# Patient Record
Sex: MALE | Race: WHITE | NOT HISPANIC OR LATINO | URBAN - METROPOLITAN AREA
[De-identification: names, ages, dates, MRNs, and addresses within clinical notes are randomized per-mention and may not be internally consistent; named-entity substitution may affect disease eponyms.]

---

## 2023-05-31 RX ORDER — LEVOTHYROXINE SODIUM 125 MCG
1 TABLET ORAL
Refills: 0 | DISCHARGE

## 2023-05-31 RX ORDER — ALLOPURINOL 300 MG
0 TABLET ORAL
Refills: 0 | DISCHARGE

## 2023-05-31 NOTE — ASU PATIENT PROFILE, ADULT - FALL HARM RISK - UNIVERSAL INTERVENTIONS
Bed in lowest position, wheels locked, appropriate side rails in place/Call bell, personal items and telephone in reach/Instruct patient to call for assistance before getting out of bed or chair/Non-slip footwear when patient is out of bed/[place] to call system/Physically safe environment - no spills, clutter or unnecessary equipment/Purposeful Proactive Rounding/Room/bathroom lighting operational, light cord in reach

## 2023-05-31 NOTE — ASU PATIENT PROFILE, ADULT - NSICDXPASTSURGICALHX_GEN_ALL_CORE_FT
PAST SURGICAL HISTORY:  History of kidney donation     History of surgery on left wrist     Left ACL tear Meniscus tear

## 2023-05-31 NOTE — ASU PATIENT PROFILE, ADULT - REASON FOR ADMISSION, PROFILE
-Patient noted to have large amount of ascites on imaging  -Will go for paracentesis today  -Will test to rule out SBP   Septoplasty

## 2023-06-01 ENCOUNTER — OUTPATIENT (OUTPATIENT)
Dept: OUTPATIENT SERVICES | Facility: HOSPITAL | Age: 43
LOS: 1 days | Discharge: HOME | End: 2023-06-01

## 2023-06-01 VITALS
OXYGEN SATURATION: 100 % | SYSTOLIC BLOOD PRESSURE: 135 MMHG | TEMPERATURE: 98 F | DIASTOLIC BLOOD PRESSURE: 66 MMHG | RESPIRATION RATE: 16 BRPM | HEART RATE: 92 BPM

## 2023-06-01 VITALS
HEIGHT: 73 IN | TEMPERATURE: 98 F | HEART RATE: 55 BPM | SYSTOLIC BLOOD PRESSURE: 107 MMHG | DIASTOLIC BLOOD PRESSURE: 71 MMHG | RESPIRATION RATE: 14 BRPM | WEIGHT: 238.1 LBS | OXYGEN SATURATION: 98 %

## 2023-06-01 DIAGNOSIS — Z90.5 ACQUIRED ABSENCE OF KIDNEY: Chronic | ICD-10-CM

## 2023-06-01 DIAGNOSIS — Z98.890 OTHER SPECIFIED POSTPROCEDURAL STATES: Chronic | ICD-10-CM

## 2023-06-01 DIAGNOSIS — S83.512A SPRAIN OF ANTERIOR CRUCIATE LIGAMENT OF LEFT KNEE, INITIAL ENCOUNTER: Chronic | ICD-10-CM

## 2023-06-01 RX ORDER — SODIUM CHLORIDE 9 MG/ML
500 INJECTION, SOLUTION INTRAVENOUS
Refills: 0 | Status: DISCONTINUED | OUTPATIENT
Start: 2023-06-01 | End: 2023-06-01

## 2023-06-01 RX ORDER — HYDROMORPHONE HYDROCHLORIDE 2 MG/ML
0.5 INJECTION INTRAMUSCULAR; INTRAVENOUS; SUBCUTANEOUS
Refills: 0 | Status: DISCONTINUED | OUTPATIENT
Start: 2023-06-01 | End: 2023-06-01

## 2023-06-01 RX ORDER — APREPITANT 80 MG/1
40 CAPSULE ORAL ONCE
Refills: 0 | Status: COMPLETED | OUTPATIENT
Start: 2023-06-01 | End: 2023-06-01

## 2023-06-01 RX ORDER — ONDANSETRON 8 MG/1
4 TABLET, FILM COATED ORAL ONCE
Refills: 0 | Status: COMPLETED | OUTPATIENT
Start: 2023-06-01 | End: 2023-06-01

## 2023-06-01 RX ORDER — OXYCODONE HYDROCHLORIDE 5 MG/1
5 TABLET ORAL ONCE
Refills: 0 | Status: DISCONTINUED | OUTPATIENT
Start: 2023-06-01 | End: 2023-06-01

## 2023-06-01 RX ORDER — ACETAMINOPHEN 500 MG
1000 TABLET ORAL ONCE
Refills: 0 | Status: COMPLETED | OUTPATIENT
Start: 2023-06-01 | End: 2023-06-01

## 2023-06-01 RX ORDER — FENTANYL CITRATE 50 UG/ML
50 INJECTION INTRAVENOUS
Refills: 0 | Status: DISCONTINUED | OUTPATIENT
Start: 2023-06-01 | End: 2023-06-01

## 2023-06-01 RX ORDER — SODIUM CHLORIDE 9 MG/ML
1000 INJECTION, SOLUTION INTRAVENOUS ONCE
Refills: 0 | Status: COMPLETED | OUTPATIENT
Start: 2023-06-01 | End: 2023-06-01

## 2023-06-01 RX ADMIN — HYDROMORPHONE HYDROCHLORIDE 0.5 MILLIGRAM(S): 2 INJECTION INTRAMUSCULAR; INTRAVENOUS; SUBCUTANEOUS at 20:25

## 2023-06-01 RX ADMIN — Medication 1000 MILLIGRAM(S): at 13:45

## 2023-06-01 RX ADMIN — SODIUM CHLORIDE 1000 MILLILITER(S): 9 INJECTION, SOLUTION INTRAVENOUS at 20:26

## 2023-06-01 RX ADMIN — HYDROMORPHONE HYDROCHLORIDE 0.5 MILLIGRAM(S): 2 INJECTION INTRAMUSCULAR; INTRAVENOUS; SUBCUTANEOUS at 20:40

## 2023-06-01 RX ADMIN — OXYCODONE HYDROCHLORIDE 5 MILLIGRAM(S): 5 TABLET ORAL at 21:09

## 2023-06-01 RX ADMIN — ONDANSETRON 4 MILLIGRAM(S): 8 TABLET, FILM COATED ORAL at 20:40

## 2023-06-01 RX ADMIN — OXYCODONE HYDROCHLORIDE 5 MILLIGRAM(S): 5 TABLET ORAL at 21:30

## 2023-06-01 RX ADMIN — APREPITANT 40 MILLIGRAM(S): 80 CAPSULE ORAL at 13:44

## 2023-06-01 NOTE — PRE-ANESTHESIA EVALUATION ADULT - NSANTHADDINFOFT_GEN_ALL_CORE
Pt accepts all anesthesia risks IBNLT: Dental, Pharyngeal, Laryngeal, Tracheal Trauma, Sore Throat, Hoarseness, Recurrent Laryngeal Nerve Dysfunction, Upper and Lower Extremity Paresthesias, Nausea and Vomiting, Potential exacerbation of asthma and JAMARCUS.

## 2023-06-01 NOTE — PRE-ANESTHESIA EVALUATION ADULT - NSPREOPDXFT_GEN_ALL_CORE
OFFICE VISIT    Patient: Robbie Durbin   : 1981 MRN: [id number]    SUBJECTIVE:  Chief Complaint   Patient presents with   • Annual Exam       Patient has given consent to record this visit for documentation in their clinical record.    A 41 year old male presents for annual physical examination.     HISTORY OF PRESENT ILLNESS:  Historian: Self.    Preventative health care:  Diet and exercise: Doing running daily but not always eating healthy food.  Immunization: Inquires about COVID-19 vaccine.  Screening labs:Due for routine labs.     Need for influenza vaccination: Requests the Influenza vaccine.    Chest wall discomfort: Has pain in the chest while taking deep breath from one week. States physical activity causes chest discomfort. Working in the [employer] where he worked a task after a gap of two years of doing this task.  Involved lowering essentially a lever against his right chest repeatedly with force.    Additional comments:  Nasal discharge: Reports nasal discharge due to the weather change. Wearing his mask today.    Elevated LDL level: States the LDL levels has been elevating for about two years. Reports family history of high cholesterol in his father.     PAST MEDICAL HISTORY:  Past Medical History:   Diagnosis Date   • Kidney stone    • Prostatitis      MEDICATIONS:  Current Outpatient Medications   Medication Sig Dispense Refill   • fluticasone (FLONASE) 50 MCG/ACT nasal spray Spray 2 sprays in each nostril daily. 48 g 3     No current facility-administered medications for this visit.     ALLERGIES:  Allergies as of 2022   • (No Known Allergies)     FAMILY HISTORY:  Family History   Problem Relation Age of Onset   • Cataracts Mother    • Hypertension Mother    • Diabetes Mother    • Hyperlipidemia Father    • Sjogren's Syndrome Sister    • Cancer Sister         eye   • Patient is unaware of any medical problems Sister    • Patient is unaware of any medical problems Brother    • Cataracts  Maternal Grandmother    • Cataracts Maternal Grandfather    • Macular degeneration Maternal Grandfather    • Patient is unaware of any medical problems Paternal Grandmother    • Alcohol Abuse Paternal Grandfather      SOCIAL HISTORY:  Social History     Tobacco Use   • Smoking status: Never Smoker   • Smokeless tobacco: Never Used   Vaping Use   • Vaping Use: never used   Substance Use Topics   • Alcohol use: Yes     Comment: occ   • Drug use: Never     Past Surgical HISTORY  Past Surgical History:   Procedure Laterality Date   • Appendectomy     • Lasik surgery Bilateral    • [place] tooth extraction         REVIEW OF SYSTEMS:  Musculoskeletal: Per HPI.    OBJECTIVE:  Visit Vitals  /70   Pulse (!) 46   Temp 96 °F (35.6 °C)   Ht 6' 2\" (1.88 m)   Wt 77.6 kg (171 lb)   BMI 21.96 kg/m²       PHYSICAL EXAM:  Constitutional: alert, in no acute distress and current vital signs reviewed.   Head and Face: atraumatic, no deformities, normocephalic, normal facies.  Eyes: no discharge, normal conjunctiva, no eyelid swelling, no ptosis and the sclerae were normal. Pupils equal, round and reactive to light, conjugate gaze and extraocular movements were intact.   ENT: normal appearing outer ear, normal appearing nose. Examination of the tympanic membrane showed normal landmarks, normal appearing external canal. Nasal mucosa moist and pink, no nasal discharge. Normal lips. Oral mucosa pink and moist, no oral lesions.   Neck: normal appearing neck, supple neck and no mass was seen. Thyroid not enlarged and no thyroid nodules.   Lymphatic: no lymphadenopathy.   Pulmonary: no respiratory distress, normal respiratory rate and effort and no accessory muscle use. Breath sounds clear to auscultation bilaterally.   Cardiovascular: normal rate, no murmurs were heard, regular rhythm, normal S1 and normal S2. Edema was not present in the lower extremities.   Abdomen: soft, nontender, nondistended, normal bowel sounds and no abdominal  mass. No hepatomegaly and no splenomegaly. No umbilical hernia was discovered.   Musculoskeletal: Has tenderness of the right upper costochondral junction to palpation.  Neurologic: cranial nerves grossly intact. No sensory deficits noted. No coordination deficits. Normal gait. Muscle strength and tone were normal.   Psychiatric: oriented to person, oriented to place and oriented to time. Alert and awake, interactive and mood/affect were appropriate. Judgment not impaired. Normal attention span. Short term memory intact.   Skin, Hair, Nails: normal skin color and pigmentation and no rash. No skin ulcer was seen. Normal skin turgor. No clubbing or cyanosis of the fingernails.     DIAGNOSTIC STUDIES:  LAB RESULTS:  Lab Services on 09/16/2022   Component Date Value Ref Range Status   • CHOLESTEROL, TOTAL 09/16/2022 176  140 - 200 mg/dL Final   • HDL CHOLESTEROL 09/16/2022 60  >40 mg/dL Final   • LDL CHOL, CALCULATED 09/16/2022 109 (A) 30 - 100 mg/dL Final   • TRIGLYCERIDES 09/16/2022 37  0 - 200 mg/dL Final   • CALCIUM, SERUM 09/16/2022 9.1  8.6 - 10.6 mg/dL Final   • ALBUMIN, SERUM 09/16/2022 4.4  3.6 - 5.1 g/dL Final   • ALKALINE PHOSPHATASE([id number]) 09/16/2022 57  45 - 115 U/L Final   • ALANINE AMINOTRANSFERASE(SGPT) 09/16/2022 28  5 - 49 U/L Final   • ASPARTATE AMINOTRNSFRASE(SGOT) 09/16/2022 40  14 - 43 U/L Final   • BILIRUBIN, TOTAL 09/16/2022 0.9  0.0 - 1.3 mg/dL Final   • BLOOD UREA NITROGEN 09/16/2022 17  6 - 27 mg/dL Final   • CHLORIDE, SERUM 09/16/2022 108 (A) 96 - 107 mmol/L Final   • CO2 VENOUS 09/16/2022 29  22 - 32 mmol/L Final   • CREATININE, SERUM 09/16/2022 0.9  0.6 - 1.6 mg/dL Final   • K (POTASSIUM, SERUM) 09/16/2022 5.2  3.5 - 5.3 mmol/L Final   • NA (SODIUM, SERUM) 09/16/2022 141  136 - 146 mmol/L Final   • GLUCOSE, FASTING 09/16/2022 94  60 - 100 mg/dL Final   • PROTEIN, TOTAL SERUM 09/16/2022 6.9  6.4 - 8.5 g/dL Final   • EGFR*  09/16/2022 >60  >60 mL/min/[1.73m2] Final   • EGFR*  NON- 09/16/2022 >60  >60 mL/min/[1.73m2] Final   • WHITE BLOOD CELL COUNT 09/16/2022 6.3  4.0 - 10.0 10*3/uL Final   • RED BLOOD CELL COUNT 09/16/2022 5.07  3.90 - 5.70 10*6/uL Final   • HEMOGLOBIN 09/16/2022 15.1  13.7 - 17.5 g/dL Final   • HEMATOCRIT 09/16/2022 44.5  40.0 - 51.0 % Final   • MEAN CORPUSCULAR VOLUME 09/16/2022 87.8  79.0 - 95.0 fL Final   • MEAN CORPUSCULAR HGB 09/16/2022 29.8  27.0 - 34.0 pg Final   • MEAN CORPUSCULAR HGB CONCENTRATION 09/16/2022 33.9  32.0 - 36.0 % Final   • PLATELET COUNT 09/16/2022 244  150 - 400 10*3/uL Final   • MEAN PLATELET VOLUME 09/16/2022 9.7  8.6 - 12.4 fL Final   • RED CELL DISTRIBUTION WIDTH 09/16/2022 12.7  11.3 - 14.8 % Final   • NEUTROPHIL PERCENT 09/16/2022 51.2  34.0 - 73.5 % Final   • NEUTROPHIL ABSOLUTE # 09/16/2022 3.2  1.4 - 6.5 10*3/uL Final   • IMMATURE GRANULOCYTE PERCENT 09/16/2022 0.3  0.0 - 0.5 % Final   • IMMATURE GRANULOCYTE ABSOLUTE 09/16/2022 0.02  0.00 - 0.05 10*3/uL Final   • LYMPH PERCENT 09/16/2022 33.9  20.5 - 51.1 % Final   • LYMPHOCYTE ABSOLUTE # 09/16/2022 2.1  1.2 - 3.4 10*3/uL Final   • MONOCYTE PERCENT 09/16/2022 7.2  4.3 - 12.9 % Final   • MONOCYTE ABSOLUTE # 09/16/2022 0.5  0.2 - 0.9 10*3/uL Final   • EOSINOPHIL % 09/16/2022 6.9  0.0 - 10.0 % Final   • EOSINOPHIL ABSOLUTE # 09/16/2022 0.4  0.0 - 0.5 10*3/uL Final   • BASOPHIL % 09/16/2022 0.5  0.0 - 1.2 % Final   • BASOPHIL ABSOLUTE # 09/16/2022 0.0  0.0 - 0.1 10*3/uL Final   • DIFFERENTIAL TYPE 09/16/2022 AUTO DIFF   Final       ASSESSMENT AND PLAN:  This 41 year old male presents with :  1. Preventative health care    2. Influenza vaccine needed    3. Chest wall discomfort        Orders Placed This Encounter   • INFLUENZA QUADRIVALENT SPLIT PRES FREE 0.5 ML VACC, IM (FLULAVAL,FLUARIX,FLUZONE)   • CBC with Automated Differential   • COMPREHENSIVE MET PNL (FAST)   • Lipid Panel With Reflex       PLAN:  Preventative health care:  Diet and exercise:   Recommended doing  regular running and healthy diet.  Immunization:  Recommended getting COVID-19 booster vaccine when eligible.  Informed the COVID-19 vaccine is available in the pharmacy.  Screening labs:  Ordered CBC, CMP, and lipid panel.  Informed will send lab results through [facility].    Need for influenza vaccination:  Administered influenza vaccine today in the office.    Chest wall discomfort:  The symptoms, history and exam are suggestive of costochondritis.  Take Ibuprofen as needed.  Recommended cold compression as needed.  Recommended not doing heavy physical activities for about 5 to 7 days.  Recommended monitoring pain level.    Additional plan:  Nasal discharge:  Prescribed Flonase 50 mcg/ACT nasal spray. Spray 2 sprays in each nostril days.    Elevated LDL level:  Reviewed and discussed the previous labs.  Informed will consider the medication for cholesterol as needed in the future.    Informed colonoscopy eligibility was reduced to 45 years and discussed the incidences of increasing colon cancers.    Refer to orders.  Medical compliance with plan discussed and risks of non-compliance reviewed.  Patient education completed on disease process, etiology & prognosis.  Proper usage and side effects of medications reviewed & discussed.  Return to clinic as clinically indicated as discussed with the patient who verbalized understanding of the plan and is in agreement with the plan.    I,  Dr. Robe Roth, have created a visit summary document based on the audio recording between Dr. Beena Wesley MD and this patient for the physician to review, edit as needed, and authenticate.    Creation Date: 9/16/2022      I have reviewed and edited the visit summary above and attest that it is accurate.       DNS/Turbinate Hypertrophy/Nasal Valve Stenosis

## 2023-06-01 NOTE — ASU DISCHARGE PLAN (ADULT/PEDIATRIC) - CARE PROVIDER_API CALL
Marlon Estrella  Otolaryngology  [address] [place] [address], [address]  [place] [address]  Phone: [phone]  Fax: [phone]  Follow Up Time:

## (undated) DEVICE — Device

## (undated) DEVICE — GLV 7.5 PROTEXIS (WHITE)

## (undated) DEVICE — SUT ETHILON 4-0 18" P-3 UNDYED

## (undated) DEVICE — ELCTR COLORADO 3CM

## (undated) DEVICE — SUT PLAIN GUT 4-0 18" SC-1

## (undated) DEVICE — SUT CHROMIC 5-0 18" P-3

## (undated) DEVICE — SLV COMPRESSION KNEE MED

## (undated) DEVICE — SUT ETHILON 3-0 18" FS-1

## (undated) DEVICE — SUT CHROMIC 5-0 18" PS-3

## (undated) DEVICE — SUT PROLENE 6-0 18" P-3

## (undated) DEVICE — PACK RHINOPLASTY

## (undated) DEVICE — SUT PROLENE 5-0 18" P-3

## (undated) DEVICE — DRSG STERISTRIPS 0.5 X 4"

## (undated) DEVICE — SUT ETHILON 5-0 18" P-3 UNDYED